# Patient Record
Sex: MALE | Race: WHITE | NOT HISPANIC OR LATINO
[De-identification: names, ages, dates, MRNs, and addresses within clinical notes are randomized per-mention and may not be internally consistent; named-entity substitution may affect disease eponyms.]

---

## 2019-07-26 ENCOUNTER — APPOINTMENT (OUTPATIENT)
Dept: UROLOGY | Facility: CLINIC | Age: 24
End: 2019-07-26
Payer: MEDICAID

## 2019-07-26 VITALS — SYSTOLIC BLOOD PRESSURE: 130 MMHG | DIASTOLIC BLOOD PRESSURE: 72 MMHG

## 2019-07-26 PROBLEM — Z00.00 ENCOUNTER FOR PREVENTIVE HEALTH EXAMINATION: Status: ACTIVE | Noted: 2019-07-26

## 2019-07-26 PROCEDURE — 99203 OFFICE O/P NEW LOW 30 MIN: CPT

## 2019-07-26 NOTE — ASSESSMENT
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

## 2019-07-26 NOTE — PHYSICAL EXAM
[General Appearance - Well Developed] : well developed [General Appearance - Well Nourished] : well nourished [Normal Appearance] : normal appearance [Well Groomed] : well groomed [Edema] : no peripheral edema [Respiration, Rhythm And Depth] : normal respiratory rhythm and effort [Exaggerated Use Of Accessory Muscles For Inspiration] : no accessory muscle use [Auscultation Breath Sounds / Voice Sounds] : lungs were clear to auscultation bilaterally [Bowel Sounds] : normal bowel sounds [Abdomen Soft] : soft [Abdomen Tenderness] : non-tender [] : no hepato-splenomegaly [Abdomen Mass (___ Cm)] : no abdominal mass palpated [Urethral Meatus] : meatus normal [Penis Abnormality] : normal circumcised penis [Normal Station and Gait] : the gait and station were normal for the patient's age [Skin Color & Pigmentation] : normal skin color and pigmentation [No Focal Deficits] : no focal deficits [Oriented To Time, Place, And Person] : oriented to person, place, and time [Inguinal Lymph Nodes Enlarged Bilaterally] : inguinal [FreeTextEntry1] : left testicle normal; markedly thickened cord; not able to appeciate vas; right atropic; vas+

## 2019-07-26 NOTE — HISTORY OF PRESENT ILLNESS
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\par  [Urinary Incontinence] : no urinary incontinence [Urinary Urgency] : no urinary urgency [Urinary Frequency] : no urinary frequency [Urinary Retention] : no urinary retention [Nocturia] : no nocturia [Straining] : no straining [Intermittency] : no intermittency [Weak Stream] : no weak stream [Post-Void Dribbling] : no post-void dribbling [Hematuria - Gross] : no gross hematuria [Dysuria] : no dysuria [Bladder Spasm] : no bladder spasm [Abdominal Pain] : no abdominal pain [Hematuria - Microscopic] : no microscopic hematuria [Flank Pain] : no flank pain [Edema] : ~T edema was not present [Weight Loss] : no recent ~M [unfilled] weight loss [Fever] : no fever [Fatigue] : no fatigue [Incisional Drainage] : no incisional drainage [Anorexia] : no anorexia [Nausea] : no nausea [Ostomy Problems] : no ostomy problems [Incisional Pain] : no incisional pain

## 2019-07-29 ENCOUNTER — FORM ENCOUNTER (OUTPATIENT)
Age: 24
End: 2019-07-29

## 2019-07-30 ENCOUNTER — APPOINTMENT (OUTPATIENT)
Dept: ULTRASOUND IMAGING | Facility: CLINIC | Age: 24
End: 2019-07-30
Payer: MEDICAID

## 2019-07-30 ENCOUNTER — OUTPATIENT (OUTPATIENT)
Dept: OUTPATIENT SERVICES | Facility: HOSPITAL | Age: 24
LOS: 1 days | End: 2019-07-30

## 2019-07-30 PROCEDURE — 93975 VASCULAR STUDY: CPT | Mod: 26

## 2019-07-30 PROCEDURE — 76870 US EXAM SCROTUM: CPT | Mod: 26

## 2019-08-05 ENCOUNTER — APPOINTMENT (OUTPATIENT)
Dept: UROLOGY | Facility: CLINIC | Age: 24
End: 2019-08-05
Payer: MEDICAID

## 2019-08-05 PROCEDURE — 99213 OFFICE O/P EST LOW 20 MIN: CPT

## 2019-08-05 NOTE — ASSESSMENT
[FreeTextEntry1] : right testicle 10.2\par left testicle 16.2\par varicocele\par no varicocele\par LH 5.3\par FSH 7.9\par testosterone 256\par chromosomes pending\par microdeletion study ordered and discussed with patient\par \par The findings of a varicocele were discussed with the patient.\par The pathophysiology was discussed.\par The surgical procedure of microsurgical varicocelectomy was fully discussed. Risks and complications were reviewed including but not limited to: 1. recurrence, 2. chronic testicular pain/discomfort, 3. hydrocele (temporary or permanent), 4. testicular injury or atrophy, 5 vas or nerve injury, 6. infection, 7. bleeding ,  8. general surgical and anesthetic risks etc.\par We discussed response rates with regards to semen parameters and pregnancy rates. \par \par reviewed TESE and varicocelectomy\par \par In  light of hormonal studies and severe oligospermia will embark on \par clomid 25 mg daily\par 3 weeks \par repeat labs prior to next visit\par return with wife to review\par

## 2019-08-05 NOTE — HISTORY OF PRESENT ILLNESS
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

## 2019-08-13 ENCOUNTER — TRANSCRIPTION ENCOUNTER (OUTPATIENT)
Age: 24
End: 2019-08-13

## 2019-08-13 ENCOUNTER — OTHER (OUTPATIENT)
Age: 24
End: 2019-08-13

## 2019-08-26 ENCOUNTER — OTHER (OUTPATIENT)
Age: 24
End: 2019-08-26

## 2019-08-26 ENCOUNTER — TRANSCRIPTION ENCOUNTER (OUTPATIENT)
Age: 24
End: 2019-08-26

## 2019-09-09 ENCOUNTER — MEDICATION RENEWAL (OUTPATIENT)
Age: 24
End: 2019-09-09

## 2019-09-20 ENCOUNTER — APPOINTMENT (OUTPATIENT)
Dept: UROLOGY | Facility: CLINIC | Age: 24
End: 2019-09-20
Payer: MEDICAID

## 2019-09-20 VITALS — DIASTOLIC BLOOD PRESSURE: 66 MMHG | SYSTOLIC BLOOD PRESSURE: 114 MMHG

## 2019-09-20 PROCEDURE — 99213 OFFICE O/P EST LOW 20 MIN: CPT

## 2019-09-20 NOTE — HISTORY OF PRESENT ILLNESS
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

## 2019-09-23 ENCOUNTER — RX RENEWAL (OUTPATIENT)
Age: 24
End: 2019-09-23

## 2019-10-07 ENCOUNTER — TRANSCRIPTION ENCOUNTER (OUTPATIENT)
Age: 24
End: 2019-10-07

## 2019-10-11 ENCOUNTER — TRANSCRIPTION ENCOUNTER (OUTPATIENT)
Age: 24
End: 2019-10-11

## 2019-10-25 ENCOUNTER — APPOINTMENT (OUTPATIENT)
Dept: UROLOGY | Facility: CLINIC | Age: 24
End: 2019-10-25
Payer: MEDICAID

## 2019-10-25 DIAGNOSIS — N46.01 ORGANIC AZOOSPERMIA: ICD-10-CM

## 2019-10-25 PROCEDURE — 99213 OFFICE O/P EST LOW 20 MIN: CPT

## 2019-10-25 NOTE — ASSESSMENT
[FreeTextEntry1] : Patient was tolerating Clomid well without side effects\par added Arimidex because of hyperestrogenemia\par \par Labs on 916.2019\par estradiol 69 had been 92\par testosterone 901 had been 661\par ratio T:E >10:1\par Hbg/Hct discussed \par \par Returns on :\par Clomid 25 mg  qod\par Arimidex 1 mg  daily\par \par FSH 20.3\par LH 16.1\par Estradiol 65\par Testosterone  694\par couple expressed full understanding of hormonal results and approach\par \par Dsicussed stopping clomid inlight of continuing elevation of gonadotropins and estradiol and mild gynecomastia\par Will stop  clomid\par Will continue arimidex\par labs in 3 weeks and semen analysis prior to next visit\par Discussed varicocelectomy and not willing to do at this point\par

## 2019-10-25 NOTE — HISTORY OF PRESENT ILLNESS
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

## 2019-11-22 ENCOUNTER — APPOINTMENT (OUTPATIENT)
Dept: UROLOGY | Facility: CLINIC | Age: 24
End: 2019-11-22
Payer: MEDICAID

## 2019-11-22 PROCEDURE — 99213 OFFICE O/P EST LOW 20 MIN: CPT

## 2019-11-22 NOTE — HISTORY OF PRESENT ILLNESS
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

## 2019-11-22 NOTE — ASSESSMENT
[FreeTextEntry1] : Patient returns with his wife today.  His semen analysis demonstrates \par \par semen analysis 11.11.2019\par vilume 5.0\par [0.70]\par tc 3.5 million\par motility 57%\par \par This appears to be an improvement from his pre-treatment semen analysis.\par His endocrine studies demonstrate normalization of his estradiol excellent rise in his testosterone with his gonadotropins decreasing from the markedly elevated numbers previously noted.\par Labs:\par 10.2019\par Estradiol 65\par LH 16.1\par FSH 20.3\par T 694\par \par 11.2019\par Estradiol - 42\par testosterone 562  \par FSH 9.7\par LH 7.4\par \par tolerating medication without side effects\par again reviewed \par The findings of a varicocele were discussed with the patient.\par  Discussed indications for varicocelectomy:\par 1, fertility,\par 2. ? development of hypogonadism\par 3. Cosmesis\par 4. Pain\par \par Couple is not willing to proceed variocele\par We discussed the fact that this recent semen analysis may not be an actual improvement but may be observer variation\par Couple concerned about varicocele and infertility\par Discussed increased risk of testicular cancer in infertility\par We discussed repeating his semen analysis and blood studies in 3 months if there is no improvement at that time the couple will again entertain proceeding with varicocelectomy.  Infertility\par

## 2020-05-26 ENCOUNTER — TRANSCRIPTION ENCOUNTER (OUTPATIENT)
Age: 25
End: 2020-05-26

## 2020-05-28 ENCOUNTER — APPOINTMENT (OUTPATIENT)
Dept: UROLOGY | Facility: CLINIC | Age: 25
End: 2020-05-28
Payer: MEDICAID

## 2020-05-28 LAB
ESTRADIOL SERPL-MCNC: 38
FSH SERPL-MCNC: 7.9
LH SERPL-ACNC: 8.4
TESTOST SERPL-MCNC: 277

## 2020-05-28 PROCEDURE — 99213 OFFICE O/P EST LOW 20 MIN: CPT | Mod: 95

## 2020-06-01 NOTE — ASSESSMENT
[FreeTextEntry1] : Patient returns with his wife today.  His semen analysis demonstrates \par \par semen analysis 11.11.2019\par vilume 5.0\par [0.70]\par tc 3.5 million\par motility 57%\par \par This appears to be an improvement from his pre-treatment semen analysis.\par His endocrine studies demonstrate normalization of his estradiol excellent rise in his testosterone with his gonadotropins decreasing from the markedly elevated numbers previously noted.\par Labs:\par 10.2019\par Estradiol 65\par LH 16.1\par FSH 20.3\par T 694\par \par 11.2019\par Estradiol - 42\par testosterone 562  \par FSH 9.7\par LH 7.4\par \par tolerating medication without side effects\par again reviewed \par The findings of a varicocele were discussed with the patient.\par  Discussed indications for varicocelectomy:\par 1, fertility,\par 2. ? development of hypogonadism\par 3. Cosmesis\par 4. Pain\par \par Couple is not willing to proceed variocele\par We discussed the fact that this recent semen analysis may not be an actual improvement but may be observer variation\par Couple concerned about varicocele and infertility\par Discussed increased risk of testicular cancer in infertility\par We discussed repeating his semen analysis and blood studies in 3 months if there is no improvement at that time the couple will again entertain proceeding with varicocelectomy.  Infertility\par \par 5.2020\par LH 8.4\par FSH 7.9\par \par free T 57.3\par \par labs in 11/2019 (on anastrazole)\par LH 7.4\par FSH 9.7\par \par Estradiol 42\par \par not willing to proceed with varicocele surgery\par wants to proceed with IVF\par will resume anastrazole\par labs in one month\par fu\par semen analysis\par

## 2020-06-01 NOTE — HISTORY OF PRESENT ILLNESS
[Other Location: e.g. School (Enter Location, City,State)___] : at [unfilled], at the time of the visit. 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  The patient's identity has been confirmed.  The patient was previously emailed a copy of the telemedicine consent.  They have had a chance to review and has now given verbal consent and has requested care to be assessed and treated through telemedicine and understands there maybe limitations in this process and they may need further follow up care in the office and or hospital settings.   \par THE FOLLOWING WAS READ TO AND CONSENTED TO BY PATIENT\par By virtue of my participation in this telehealth visit, I am consenting to receive care through telehealth. Telehealth is the use of electronic information and communication technologies by providers to deliver health care to patients at a distance. \par I understand that any care provided to me through [employer]s telehealth application (“the [employer] nadya”) will incorporate security protocols to protect the privacy and security of my health information. If any other application is used to provide care to me, I understand that the technology may not contain appropriate security protocols to protect the privacy and security of my health information. My provider has explained to me the risks associated with the technology platforms that he or she is using to provide care to me. I acknowledge that there are potential risks associated with any technology used while obtaining care through telehealth, including, but not limited to, connectively interruptions, other technical difficulties, and unauthorized access by a third party to one’s health information. Despite these risks, I agree to participate in the telehealth encounter. \par I understand and agree that I or my healthcare provider may terminate a telehealth encounter at any time in the event of a technical malfunction. \par I also understand that my location determines where medicine is being practiced. As a result, I will inform my provider where I am located at the time of my telehealth visit. \par I understand that there may be costs associated with a telehealth visit. I agree that I am responsible for any fees associated with the telehealth services that I receive. \par This Informed Consent for Telehealth Services During a Public Health Emergency will remain in effect solely during the term of the public health emergency.\par \par Verbal consent given on May 28, 2020   1050 \par AM by Fabian Alonzo from Mr. MUNA BLANKENSHIP \par  \par last visit 6 months\par patient has not taken for 3 months\par patient did not get a refill of anastrazole\par wife not been seen\par no sexual function\par \par

## 2020-06-01 NOTE — HISTORY OF PRESENT ILLNESS
[Other Location: e.g. School (Enter Location, City,State)___] : at [unfilled], at the time of the visit. 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  The patient's identity has been confirmed.  The patient was previously emailed a copy of the telemedicine consent.  They have had a chance to review and has now given verbal consent and has requested care to be assessed and treated through telemedicine and understands there maybe limitations in this process and they may need further follow up care in the office and or hospital settings.   \par THE FOLLOWING WAS READ TO AND CONSENTED TO BY PATIENT\par By virtue of my participation in this telehealth visit, I am consenting to receive care through telehealth. Telehealth is the use of electronic information and communication technologies by providers to deliver health care to patients at a distance. \par I understand that any care provided to me through [employer]s telehealth application (“the [employer] nadya”) will incorporate security protocols to protect the privacy and security of my health information. If any other application is used to provide care to me, I understand that the technology may not contain appropriate security protocols to protect the privacy and security of my health information. My provider has explained to me the risks associated with the technology platforms that he or she is using to provide care to me. I acknowledge that there are potential risks associated with any technology used while obtaining care through telehealth, including, but not limited to, connectively interruptions, other technical difficulties, and unauthorized access by a third party to one’s health information. Despite these risks, I agree to participate in the telehealth encounter. \par I understand and agree that I or my healthcare provider may terminate a telehealth encounter at any time in the event of a technical malfunction. \par I also understand that my location determines where medicine is being practiced. As a result, I will inform my provider where I am located at the time of my telehealth visit. \par I understand that there may be costs associated with a telehealth visit. I agree that I am responsible for any fees associated with the telehealth services that I receive. \par This Informed Consent for Telehealth Services During a Public Health Emergency will remain in effect solely during the term of the public health emergency.\par \par Verbal consent given on May 28, 2020   1050 \par AM by Fabian Alonzo from Mr. MUNA BLANKENSHIP \par  \par last visit 6 months\par patient has not taken for 3 months\par patient did not get a refill of anastrazole\par wife not been seen\par no sexual function\par \par

## 2021-10-18 ENCOUNTER — APPOINTMENT (OUTPATIENT)
Dept: UROLOGY | Facility: CLINIC | Age: 26
End: 2021-10-18
Payer: COMMERCIAL

## 2021-10-18 VITALS
DIASTOLIC BLOOD PRESSURE: 80 MMHG | BODY MASS INDEX: 31.78 KG/M2 | WEIGHT: 222 LBS | HEIGHT: 70 IN | SYSTOLIC BLOOD PRESSURE: 119 MMHG | HEART RATE: 82 BPM

## 2021-10-18 DIAGNOSIS — F17.200 NICOTINE DEPENDENCE, UNSPECIFIED, UNCOMPLICATED: ICD-10-CM

## 2021-10-18 DIAGNOSIS — Z72.0 TOBACCO USE: ICD-10-CM

## 2021-10-18 DIAGNOSIS — E66.9 OBESITY, UNSPECIFIED: ICD-10-CM

## 2021-10-18 PROCEDURE — 99215 OFFICE O/P EST HI 40 MIN: CPT

## 2021-10-18 RX ORDER — CLOMIPHENE CITRATE 50 MG/1
50 TABLET ORAL DAILY
Qty: 15 | Refills: 0 | Status: COMPLETED | COMMUNITY
Start: 2019-08-05 | End: 2021-10-18

## 2021-10-18 RX ORDER — ANASTROZOLE TABLETS 1 MG/1
1 TABLET ORAL
Qty: 90 | Refills: 1 | Status: COMPLETED | COMMUNITY
Start: 2019-08-26 | End: 2021-10-18

## 2021-10-18 RX ORDER — ANASTROZOLE TABLETS 1 MG/1
1 TABLET ORAL
Qty: 30 | Refills: 1 | Status: COMPLETED | COMMUNITY
Start: 2020-05-28 | End: 2021-10-18

## 2021-10-18 NOTE — LETTER HEADER
[FreeTextEntry3] : Anne Marie Sky M.D.\par Director of Urology\par [facility]\par [address] [place] [address], [address]\[place] [address]

## 2021-10-18 NOTE — HISTORY OF PRESENT ILLNESS
[FreeTextEntry1] : Mr. Blankenship is a 26-year-old [employer] Jew male born June 17, 1995.  He has been with his wife Sumeet a 24-year-old [employer] Jew female for 5 years.  They saw Dr. Alonzo 20 2019 and 2020 he tried him initially on Clomid then on anastrozole and then on both together he had found a left-sided varicocele have discussed repair which the patient declined and his wife is gone through 3 cycles of IVF with 1 transfer they still have for frozen embryos but she had some issues with her prolactin and they have held off.\par \par He decided to seek urological opinion because now his left sometimes both testicles hurt its more when it is moist outside or after the shower and is moving around.  He has noted no rash and it has not been tender to the touch for example during relations [3] : 3 [Aching] : aching [Gradual] : gradual [___ Month(s) Ago] : [unfilled] month(s) ago [Moderate] : moderate in severity [Worsening] : worsening [None] : No relieving factors are noted [de-identified] : Left sometimes bilateral testicles [de-identified] : Hot weather

## 2021-10-18 NOTE — ASSESSMENT
[FreeTextEntry1] : The question is twofold what is causing the pain in the testicles hello can you do to help him have children.  With respect to the pain in the testicles I really do not see anything in the left and the right side is probably a spermatocele and/or scar tissue from the orchidopexy.  I will send him for an ultrasound though I know he had one before he will point out to the sonographer where he is having the pain and they will tell me if they can see anything.  If he indeed does have a varicocele and I did not check him for that today repair post orchidopexy is of increased risk and given that she has her own reasons to need IVF this of anti mullerian hormone levels I am not sure fixing the varicocele would be cost effective.  I need to hear from her gynecologist/reproductive endocrinologist.\par \par As far as having children they have for frozen embryos I understand that there was a question of her prolactin but by now but should be it been fixed and not sure what they are waiting for.  If there decided to go for more retrievals before she starts on medication he needs to go back on medicines so we can get his hormones optimized so that he can give enough sperm to fertilized his many eggs that she can make.  I would want a premedication set of bloods and a semen analysis so I know where I am starting from and again a note from her doctor as to what they plan to do for her.

## 2021-10-18 NOTE — PHYSICAL EXAM
[General Appearance - Well Developed] : well developed [General Appearance - Well Nourished] : well nourished [Normal Appearance] : normal appearance [Well Groomed] : well groomed [General Appearance - In No Acute Distress] : no acute distress [Abdomen Soft] : soft [Abdomen Tenderness] : non-tender [Costovertebral Angle Tenderness] : no ~M costovertebral angle tenderness [Urethral Meatus] : meatus normal [Penis Abnormality] : normal circumcised penis [Heart Rate And Rhythm] : Heart rate and rhythm were normal [Edema] : no peripheral edema [] : no respiratory distress [Respiration, Rhythm And Depth] : normal respiratory rhythm and effort [Exaggerated Use Of Accessory Muscles For Inspiration] : no accessory muscle use [Auscultation Breath Sounds / Voice Sounds] : lungs were clear to auscultation bilaterally [Oriented To Time, Place, And Person] : oriented to person, place, and time [Affect] : the affect was normal [Mood] : the mood was normal [Not Anxious] : not anxious [Normal Station and Gait] : the gait and station were normal for the patient's age [FreeTextEntry1] : Deep tendon reflexes were normal

## 2021-11-11 ENCOUNTER — RESULT REVIEW (OUTPATIENT)
Age: 26
End: 2021-11-11

## 2021-11-11 ENCOUNTER — OUTPATIENT (OUTPATIENT)
Dept: OUTPATIENT SERVICES | Facility: HOSPITAL | Age: 26
LOS: 1 days | Discharge: HOME | End: 2021-11-11
Payer: COMMERCIAL

## 2021-11-11 DIAGNOSIS — N50.811 RIGHT TESTICULAR PAIN: ICD-10-CM

## 2021-11-11 DIAGNOSIS — I86.1 SCROTAL VARICES: ICD-10-CM

## 2021-11-11 DIAGNOSIS — N50.812 LEFT TESTICULAR PAIN: ICD-10-CM

## 2021-11-11 DIAGNOSIS — E66.9 OBESITY, UNSPECIFIED: ICD-10-CM

## 2021-11-11 DIAGNOSIS — N46.11 ORGANIC OLIGOSPERMIA: ICD-10-CM

## 2021-11-11 PROCEDURE — 76870 US EXAM SCROTUM: CPT | Mod: 26

## 2022-02-01 ENCOUNTER — APPOINTMENT (OUTPATIENT)
Dept: UROLOGY | Facility: CLINIC | Age: 27
End: 2022-02-01

## 2022-03-28 ENCOUNTER — APPOINTMENT (OUTPATIENT)
Dept: UROLOGY | Facility: CLINIC | Age: 27
End: 2022-03-28
Payer: COMMERCIAL

## 2022-03-28 DIAGNOSIS — N50.811 RIGHT TESTICULAR PAIN: ICD-10-CM

## 2022-03-28 DIAGNOSIS — N50.812 LEFT TESTICULAR PAIN: ICD-10-CM

## 2022-03-28 DIAGNOSIS — Z86.79 PERSONAL HISTORY OF OTHER DISEASES OF THE CIRCULATORY SYSTEM: ICD-10-CM

## 2022-03-28 PROCEDURE — 99214 OFFICE O/P EST MOD 30 MIN: CPT | Mod: 95

## 2022-03-28 NOTE — PHYSICAL EXAM
[General Appearance - Well Developed] : well developed [General Appearance - Well Nourished] : well nourished [Normal Appearance] : normal appearance [Well Groomed] : well groomed [FreeTextEntry1] : Morbidly obese [General Appearance - In No Acute Distress] : no acute distress [] : no respiratory distress [Respiration, Rhythm And Depth] : normal respiratory rhythm and effort [Oriented To Time, Place, And Person] : oriented to person, place, and time [Exaggerated Use Of Accessory Muscles For Inspiration] : no accessory muscle use [Affect] : the affect was normal [Mood] : the mood was normal [Not Anxious] : not anxious

## 2022-03-28 NOTE — HISTORY OF PRESENT ILLNESS
[FreeTextEntry3] : he has received, reviewed and agreed to the telemedicine consent  [FreeTextEntry1] : Mr. 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 seen October 18 2021 with pain in his testicles and the question being the need for additional sperm and/or hormonal manipulation\par \par As far as the testicular pain I wanted a scrotal ultrasound supine and upright as well as him pointing out to the technician where he feels the discomfort to see if they can find any anatomical changes\par \par If he wanted to pursue fertility I wanted blood test and a semen analysis as well as a note from her doctor as to whether or not they thought the issue with sperm are or combination thereof\par \par Blood test was done on October 26, 2021\par \par Ultrasound was done on November 11, 2021\par \par He was coming back now almost 4 months later for review\par he had a semen analysis and unfortunately he is in [place] so we will need to reschedule\par \par He is now in [place] and we are setting p today's visit to see what happened since then and discuss the findings he tells me since last seen they did end of embryo transfer unfortunately did not work they have 2 embryos left and they are going to do PGD on those 2 embryos\par \par \par L/M for patient\par No Bioavailable Testostertone\par semen analysis??\par \par [phone]\par [email or web address]  [3] : 3 [Aching] : aching [Gradual] : gradual [___ Month(s) Ago] : [unfilled] month(s) ago [Moderate] : moderate in severity [Worsening] : worsening [None] : No relieving factors are noted [de-identified] : Left sometimes bilateral testicles [de-identified] : Hot weather

## 2023-10-31 ENCOUNTER — APPOINTMENT (OUTPATIENT)
Dept: UROLOGY | Facility: CLINIC | Age: 28
End: 2023-10-31
Payer: COMMERCIAL

## 2023-10-31 VITALS
WEIGHT: 222 LBS | HEART RATE: 75 BPM | DIASTOLIC BLOOD PRESSURE: 85 MMHG | TEMPERATURE: 97.6 F | BODY MASS INDEX: 31.78 KG/M2 | HEIGHT: 70 IN | SYSTOLIC BLOOD PRESSURE: 131 MMHG

## 2023-10-31 DIAGNOSIS — E28.0 ESTROGEN EXCESS: ICD-10-CM

## 2023-10-31 DIAGNOSIS — N62 HYPERTROPHY OF BREAST: ICD-10-CM

## 2023-10-31 DIAGNOSIS — N46.11 ORGANIC OLIGOSPERMIA: ICD-10-CM

## 2023-10-31 PROCEDURE — 99214 OFFICE O/P EST MOD 30 MIN: CPT

## 2023-10-31 PROCEDURE — 93975 VASCULAR STUDY: CPT

## 2023-11-06 ENCOUNTER — NON-APPOINTMENT (OUTPATIENT)
Age: 28
End: 2023-11-06

## 2023-11-06 LAB
ESTRADIOL SERPL-MCNC: 37 PG/ML
FSH SERPL-MCNC: 5.9 IU/L
LH SERPL-ACNC: 5 IU/L
TESTOST FREE SERPL-MCNC: 10.4 PG/ML
TESTOST SERPL-MCNC: 325 NG/DL

## 2024-03-06 ENCOUNTER — APPOINTMENT (OUTPATIENT)
Dept: UROLOGY | Facility: CLINIC | Age: 29
End: 2024-03-06
Payer: COMMERCIAL

## 2024-03-06 VITALS
DIASTOLIC BLOOD PRESSURE: 74 MMHG | WEIGHT: 223 LBS | HEART RATE: 84 BPM | OXYGEN SATURATION: 97 % | TEMPERATURE: 97.2 F | HEIGHT: 70 IN | BODY MASS INDEX: 31.92 KG/M2 | SYSTOLIC BLOOD PRESSURE: 108 MMHG

## 2024-03-06 PROCEDURE — 99214 OFFICE O/P EST MOD 30 MIN: CPT

## 2024-03-06 NOTE — HISTORY OF PRESENT ILLNESS
[FreeTextEntry1] : Dear Dr. Westbrook  ( Urologist)  Thank you so much for the referral to help care for your patient.  Chief Complaint: Varicocele Date of first visit: 03/06/2024   MUNA NOYOLA  is a 28 year old male who presents for varicocele embolization.  He has a primary c/o infertility x6-7+ years with one partner (wife). No children between either of them. He does have a PMH sig for orchiopexy (?unknown if bilateral, performed as infant). Previously treated by Dr. Kimball ([place]) for hypogonadism w/ clomid + arimidex. SA with low volume. No longer on any medications.   He does note pain, described as dull a/w a Left varicocele (3.7mm seen on ULS 10/31/23), worse if he is on his feet for prolonged periods of time. Previously evaluated by Dr. Westbrook and discussed surgery vs. embolization. Semen analysis at the time sig for Poor DNA Fragmentation index.    Wife with workup significant only for borderline-low AMH, otherwise negative per patient.  Per discussion with rabbi, he prefers embolization.   He denies any issues with voiding, erectile dysfunction, ejaculation.    Estradiol 37 pg/mL 10/31/23  FSH 5.9 IU/L 10/31/23  LH 5.0 IU/L 10/31/23  Testosterone 325 ng/dL 10/31/23  SA 11/2019: 5 / 0.7 M/ml / 3.5 M / 57% motility  MRI Hx  Biopsy Hx  U/S Hx: U/S testicles               11/11/2021- 4 mm right lower pole tunical cyst. Small left epididymal head cyst. Otherwise unremarkable.  U/S Scrotum              07/30/2019- Low right testicular volume. Echogenic however normal sized with normal vascularity right epididymis this may suggest chronic epididymitis. Moderate left sided varicocele. No left sided hernia is identified.  03/06/2024 IPSS       QOL  ANISH  EPIC 26   The patient denies fevers, chills, nausea and or vomiting and no unexplained weight loss.  All pertinent laboratory, films and physician notes were reviewed.  Questionnaire results were discussed with patient.

## 2024-03-06 NOTE — PHYSICAL EXAM
[General Appearance - Well Developed] : well developed [General Appearance - Well Nourished] : well nourished [Normal Appearance] : normal appearance [Edema] : no peripheral edema [General Appearance - In No Acute Distress] : no acute distress [] : no respiratory distress [Exaggerated Use Of Accessory Muscles For Inspiration] : no accessory muscle use [Abdomen Tenderness] : non-tender [Abdomen Soft] : soft [Penis Abnormality] : normal circumcised penis [Normal Station and Gait] : the gait and station were normal for the patient's age [Skin Lesions] : no skin lesions [No Focal Deficits] : no focal deficits [Oriented To Time, Place, And Person] : oriented to person, place, and time [de-identified] : Left grade II varicocele, normal R testicle

## 2024-03-06 NOTE — ASSESSMENT
[FreeTextEntry1] : 27yo M who presents with infertility x6+ years a/w L. 3.7mm varicocele. Previously seen by Dr. Kimball for infertility ([place]) and on clomid+arimidex (since discontinued).   Infertility a/w L. varicocele - Hormonal workup (Estradiol, FSH, LH, Testosterone WNL; October) - Previously saw Dr. Westbrook, would like to pursue embolization over surgery (dicsussed w/ Rab).  - Will book for left varicocele embolization - Preop labs, no medical clearance (no PMH/meds) - Mallimpati II - Post-SA and ULS at 5-months  We discussed disease process and treatment options for symptomatic varicoceles and infertility. The patient understands the scrotal pain may resolve the heaviness that he feels in his scrotum however improving his fertility is not 100% guaranteed. There are studies that show there is improvement in semen parameters after treatment of the varicocele.  We discussed the risks and benefits alternatives associated with gonadal embolization regards to nontarget embolization complications of access and migration of coils. We also discussed the success rates of procedures in comparison to surgery.   We had an in-depth conversation regarding the benefit of varicocelectomy today. He understands the literature which overwhelming reports a benefit in improvement in semen parameters. He also understands that there is limited data in terms of spontaneous pregnancy and take-home baby rates. There is good data suggesting improved semen parameters which can possibly obviate the need for future IVF and has been shown to improve IVF outcomes in selected patients. We discussed the recent results of a [place] review which reported that one natural pregnancy is achieved for every 3-5 varicocele repairs over an unknown time frame. We also spoke about the fact that there is a 3-to-6-month delay before improvement is seen in semen parameters. The role of advancing maternal age was discussed in depth.   Surgical risks, including a risk of infection, injury to the testicular artery (extremely rare), injury to the vas deferens and hydrocele were discussed, as was post operative pain and pain control. Post operative skin numbness in the anterior thigh/lateral scrotum were also discussed.    Thank you very much for allowing me to assist in the care of this patient. Should you have any additional questions or concerns please do not hesitate to contact me.  Sincerely,   Art R. Rastinehad D.O. Professor of Urology and Radiology  of Urology at [facility] Director for Prostate Cancer [address], [address] [facility], [facility] Phone: [phone].

## 2024-03-07 ENCOUNTER — TRANSCRIPTION ENCOUNTER (OUTPATIENT)
Age: 29
End: 2024-03-07

## 2024-03-07 LAB
ANION GAP SERPL CALC-SCNC: 19 MMOL/L
BUN SERPL-MCNC: 13 MG/DL
CALCIUM SERPL-MCNC: 10.2 MG/DL
CHLORIDE SERPL-SCNC: 99 MMOL/L
CO2 SERPL-SCNC: 23 MMOL/L
CREAT SERPL-MCNC: 0.91 MG/DL
EGFR: 119 ML/MIN/1.73M2
GLUCOSE SERPL-MCNC: 88 MG/DL
HCT VFR BLD CALC: 46.6 %
HGB BLD-MCNC: 15.2 G/DL
MCHC RBC-ENTMCNC: 27.7 PG
MCHC RBC-ENTMCNC: 32.6 GM/DL
MCV RBC AUTO: 84.9 FL
PLATELET # BLD AUTO: 224 K/UL
POTASSIUM SERPL-SCNC: 4.4 MMOL/L
RBC # BLD: 5.49 M/UL
RBC # FLD: 13.3 %
SODIUM SERPL-SCNC: 141 MMOL/L
WBC # FLD AUTO: 6.75 K/UL

## 2024-05-07 ENCOUNTER — APPOINTMENT (OUTPATIENT)
Dept: INTERVENTIONAL RADIOLOGY/VASCULAR | Facility: HOSPITAL | Age: 29
End: 2024-05-07

## 2024-05-07 ENCOUNTER — OUTPATIENT (OUTPATIENT)
Dept: OUTPATIENT SERVICES | Facility: HOSPITAL | Age: 29
LOS: 1 days | End: 2024-05-07
Payer: COMMERCIAL

## 2024-05-07 ENCOUNTER — APPOINTMENT (OUTPATIENT)
Dept: UROLOGY | Facility: HOSPITAL | Age: 29
End: 2024-05-07

## 2024-05-07 ENCOUNTER — RESULT REVIEW (OUTPATIENT)
Age: 29
End: 2024-05-07

## 2024-05-07 PROCEDURE — C1894: CPT

## 2024-05-07 PROCEDURE — 75831 VEIN X-RAY KIDNEY: CPT | Mod: 59

## 2024-05-07 PROCEDURE — 99152 MOD SED SAME PHYS/QHP 5/>YRS: CPT

## 2024-05-07 PROCEDURE — 37241 VASC EMBOLIZE/OCCLUDE VENOUS: CPT

## 2024-05-07 PROCEDURE — 36012 PLACE CATHETER IN VEIN: CPT

## 2024-05-07 PROCEDURE — C1889: CPT

## 2024-05-07 PROCEDURE — 76937 US GUIDE VASCULAR ACCESS: CPT

## 2024-05-07 PROCEDURE — 99153 MOD SED SAME PHYS/QHP EA: CPT

## 2024-05-07 PROCEDURE — C1769: CPT

## 2024-05-07 PROCEDURE — C1887: CPT

## 2024-05-07 PROCEDURE — 75831 VEIN X-RAY KIDNEY: CPT | Mod: 26,LT,59

## 2024-05-07 PROCEDURE — 76937 US GUIDE VASCULAR ACCESS: CPT | Mod: 26

## 2024-06-24 ENCOUNTER — APPOINTMENT (OUTPATIENT)
Dept: UROLOGY | Facility: CLINIC | Age: 29
End: 2024-06-24

## 2024-06-24 ENCOUNTER — NON-APPOINTMENT (OUTPATIENT)
Age: 29
End: 2024-06-24

## 2024-06-24 VITALS
TEMPERATURE: 98.3 F | SYSTOLIC BLOOD PRESSURE: 121 MMHG | OXYGEN SATURATION: 98 % | HEART RATE: 88 BPM | DIASTOLIC BLOOD PRESSURE: 81 MMHG

## 2024-06-24 DIAGNOSIS — I86.1 SCROTAL VARICES: ICD-10-CM

## 2024-06-24 DIAGNOSIS — N46.9 MALE INFERTILITY, UNSPECIFIED: ICD-10-CM

## 2024-06-24 PROCEDURE — 99212 OFFICE O/P EST SF 10 MIN: CPT

## 2024-06-24 NOTE — HISTORY OF PRESENT ILLNESS
[FreeTextEntry1] : Dear Dr. Westbrook ( Urologist)  Thank you so much for the referral to help care for your patient.  Chief Complaint: Varicocele, s/p left varicocele embo 5/7/24 Date of first visit: 03/06/2024  MUNA NOYOLA is a 29 year old male who presents for 1 month follow up s/p left side varicocele embolization 5/7/24.  He has no more pain and is doing well.    He has a primary c/o infertility x6-7+ years with one partner (wife). No children between either of them. He does have a [facility] sig for orchiopexy (?unknown if bilateral, performed as infant). Previously treated by Dr. Kimball ([place]) for hypogonadism w/ clomid + arimidex. SA with low volume. No longer on any medications.  He does note pain, described as dull a/w a Left varicocele (3.7mm seen on ULS 10/31/23), worse if he is on his feet for prolonged periods of time. Previously evaluated by Dr. Westbrook and discussed surgery vs. embolization. Semen analysis at the time sig for Poor DNA Fragmentation index.  Wife with workup significant only for borderline-low AMH, otherwise negative per patient.  Per discussion with , he prefers embolization.  He denies any issues with voiding, erectile dysfunction, ejaculation.   Estradiol 37 pg/mL 10/31/23 FSH 5.9 IU/L 10/31/23 LH 5.0 IU/L 10/31/23 Testosterone 325 ng/dL 10/31/23 SA 11/2019: 5 / 0.7 M/ml / 3.5 M / 57% motility  U/S Hx: U/S testicles  11/11/2021- 4 mm right lower pole tunical cyst. Small left epididymal head cyst. Otherwise unremarkable.  U/S Scrotum  07/30/2019- Low right testicular volume. Echogenic however normal sized with normal vascularity right epididymis this may suggest chronic epididymitis. Moderate left sided varicocele. No left sided hernia is identified.  06/24/2024 IPSS       QOL ANISH EPIC  03/06/2024 IPSS QOL ANISH EPIC 26  The patient denies fevers, chills, nausea and or vomiting and no unexplained weight loss.  All pertinent laboratory, films and physician notes were reviewed. Questionnaire results were discussed with patient.

## 2024-06-24 NOTE — ASSESSMENT
[FreeTextEntry1] : 30yo M who presents with infertility x6+ years a/w L. 3.7mm varicocele. Previously seen by Dr. Kimball for infertility ([place]) and on clomid+arimidex (since discontinued).  He is doing well 5/7/24 pain resolved.  Infertility a/w L. varicocele, s/p left varicocele embo 5/7/24 - Hormonal workup (Estradiol, FSH, LH, Testosterone WNL; October 2023 - Previously saw Dr. Westbrook, would like to pursue embolization over surgery (dicsudaniel w/ ). - Post-SA and ULS at 5-months  Thank you very much for allowing me to assist in the care of this patient. Please do not hesitate to contact me with any additional questions or concerns.      Sincerely,     Nehemias Aviles D.O. Professor of Urology and Radiology  of Urology at [facility] Director for Prostate Cancer [address] [place] [address], [address] Floor [facility], [facility] Phone: [phone]

## 2024-10-30 ENCOUNTER — APPOINTMENT (OUTPATIENT)
Dept: UROLOGY | Facility: CLINIC | Age: 29
End: 2024-10-30

## 2024-11-04 ENCOUNTER — NON-APPOINTMENT (OUTPATIENT)
Age: 29
End: 2024-11-04

## 2024-12-20 NOTE — ASSESSMENT
[FreeTextEntry1] : tolerating Clomid well\par no side effects\par added Arimidex because of hyperestrogenemia\par \par estradiol 69 had been 92\par testosterone 901 had been 661\par ratio T:E >10:1\par Hbg/Hct discussed \par \par will adjust medication\par Clomid qod\par Arimidex daily\par labs in 3 weeks\par \par couple expressed full understanding of hormonal results and approach\par 
Detail Level: Detailed